# Patient Record
Sex: FEMALE | Race: BLACK OR AFRICAN AMERICAN | NOT HISPANIC OR LATINO | Employment: FULL TIME
[De-identification: names, ages, dates, MRNs, and addresses within clinical notes are randomized per-mention and may not be internally consistent; named-entity substitution may affect disease eponyms.]

---

## 2017-06-10 ENCOUNTER — HEALTH MAINTENANCE LETTER (OUTPATIENT)
Age: 47
End: 2017-06-10

## 2018-01-18 ENCOUNTER — COMMUNICATION - HEALTHEAST (OUTPATIENT)
Dept: SURGERY | Facility: CLINIC | Age: 48
End: 2018-01-18

## 2018-03-30 ENCOUNTER — OFFICE VISIT - HEALTHEAST (OUTPATIENT)
Dept: FAMILY MEDICINE | Facility: CLINIC | Age: 48
End: 2018-03-30

## 2018-03-30 DIAGNOSIS — J34.89 SINUS PRESSURE: ICD-10-CM

## 2018-07-31 ENCOUNTER — RECORDS - HEALTHEAST (OUTPATIENT)
Dept: ADMINISTRATIVE | Facility: OTHER | Age: 48
End: 2018-07-31

## 2018-07-31 ENCOUNTER — AMBULATORY - HEALTHEAST (OUTPATIENT)
Dept: SURGERY | Facility: CLINIC | Age: 48
End: 2018-07-31

## 2018-07-31 DIAGNOSIS — E66.01 MORBID OBESITY DUE TO EXCESS CALORIES (H): ICD-10-CM

## 2018-08-14 ENCOUNTER — AMBULATORY - HEALTHEAST (OUTPATIENT)
Dept: LAB | Facility: CLINIC | Age: 48
End: 2018-08-14

## 2018-08-14 ENCOUNTER — OFFICE VISIT - HEALTHEAST (OUTPATIENT)
Dept: SURGERY | Facility: CLINIC | Age: 48
End: 2018-08-14

## 2018-08-14 DIAGNOSIS — K91.2 POSTOPERATIVE MALABSORPTION: ICD-10-CM

## 2018-08-14 DIAGNOSIS — E66.01 MORBID OBESITY (H): ICD-10-CM

## 2018-08-14 LAB
ALBUMIN SERPL-MCNC: 3.4 G/DL (ref 3.5–5)
ALP SERPL-CCNC: 80 U/L (ref 45–120)
ALT SERPL W P-5'-P-CCNC: 10 U/L (ref 0–45)
ANION GAP SERPL CALCULATED.3IONS-SCNC: 9 MMOL/L (ref 5–18)
AST SERPL W P-5'-P-CCNC: 16 U/L (ref 0–40)
BILIRUB SERPL-MCNC: 0.4 MG/DL (ref 0–1)
BUN SERPL-MCNC: 9 MG/DL (ref 8–22)
CALCIUM SERPL-MCNC: 9.1 MG/DL (ref 8.5–10.5)
CHLORIDE BLD-SCNC: 108 MMOL/L (ref 98–107)
CHOLEST SERPL-MCNC: 178 MG/DL
CO2 SERPL-SCNC: 23 MMOL/L (ref 22–31)
CREAT SERPL-MCNC: 0.7 MG/DL (ref 0.6–1.1)
ERYTHROCYTE [DISTWIDTH] IN BLOOD BY AUTOMATED COUNT: 13.3 % (ref 11–14.5)
FASTING STATUS PATIENT QL REPORTED: NORMAL
FERRITIN SERPL-MCNC: 7 NG/ML (ref 10–130)
FOLATE SERPL-MCNC: 9.1 NG/ML
GFR SERPL CREATININE-BSD FRML MDRD: >60 ML/MIN/1.73M2
GLUCOSE BLD-MCNC: 90 MG/DL (ref 70–125)
HBA1C MFR BLD: 5.7 % (ref 3.5–6)
HCT VFR BLD AUTO: 33.9 % (ref 35–47)
HDLC SERPL-MCNC: 75 MG/DL
HGB BLD-MCNC: 11.3 G/DL (ref 12–16)
LDLC SERPL CALC-MCNC: 94 MG/DL
MCH RBC QN AUTO: 29.5 PG (ref 27–34)
MCHC RBC AUTO-ENTMCNC: 33.4 G/DL (ref 32–36)
MCV RBC AUTO: 88 FL (ref 80–100)
PLATELET # BLD AUTO: 262 THOU/UL (ref 140–440)
PMV BLD AUTO: 8.8 FL (ref 7–10)
POTASSIUM BLD-SCNC: 5 MMOL/L (ref 3.5–5)
PREALB SERPL-MCNC: 20.1 MG/DL (ref 19–38)
PROT SERPL-MCNC: 6.6 G/DL (ref 6–8)
PTH-INTACT SERPL-MCNC: 118 PG/ML (ref 10–86)
RBC # BLD AUTO: 3.84 MILL/UL (ref 3.8–5.4)
SODIUM SERPL-SCNC: 140 MMOL/L (ref 136–145)
TRIGL SERPL-MCNC: 43 MG/DL
VIT B12 SERPL-MCNC: >2000 PG/ML (ref 213–816)
WBC: 4.9 THOU/UL (ref 4–11)

## 2018-08-14 ASSESSMENT — MIFFLIN-ST. JEOR: SCORE: 1904.3

## 2018-08-15 LAB — 25(OH)D3 SERPL-MCNC: 21.3 NG/ML (ref 30–80)

## 2018-08-16 LAB — ZINC SERPL-MCNC: 61 UG/DL (ref 60–120)

## 2018-08-17 LAB
ANNOTATION COMMENT IMP: NORMAL
VIT A SERPL-MCNC: 0.44 MG/L (ref 0.3–1.2)
VITAMIN A (RETINYL PALMITATE): <0.02 MG/L (ref 0–0.1)

## 2018-08-18 LAB — VIT B1 PYROPHOSHATE BLD-SCNC: 87 NMOL/L (ref 70–180)

## 2018-08-21 ENCOUNTER — COMMUNICATION - HEALTHEAST (OUTPATIENT)
Dept: SURGERY | Facility: CLINIC | Age: 48
End: 2018-08-21

## 2018-08-22 ENCOUNTER — COMMUNICATION - HEALTHEAST (OUTPATIENT)
Dept: SURGERY | Facility: CLINIC | Age: 48
End: 2018-08-22

## 2018-08-22 ENCOUNTER — AMBULATORY - HEALTHEAST (OUTPATIENT)
Dept: SURGERY | Facility: CLINIC | Age: 48
End: 2018-08-22

## 2018-08-22 DIAGNOSIS — D50.9 IRON DEFICIENCY ANEMIA, UNSPECIFIED IRON DEFICIENCY ANEMIA TYPE: ICD-10-CM

## 2018-08-22 DIAGNOSIS — E21.1 HYPERPARATHYROIDISM , SECONDARY, NON-RENAL (H): ICD-10-CM

## 2018-12-19 ENCOUNTER — COMMUNICATION - HEALTHEAST (OUTPATIENT)
Dept: SURGERY | Facility: CLINIC | Age: 48
End: 2018-12-19

## 2018-12-19 ENCOUNTER — AMBULATORY - HEALTHEAST (OUTPATIENT)
Dept: SURGERY | Facility: CLINIC | Age: 48
End: 2018-12-19

## 2018-12-19 DIAGNOSIS — K91.2 POSTOPERATIVE MALABSORPTION: ICD-10-CM

## 2021-06-01 VITALS — BODY MASS INDEX: 47.98 KG/M2 | HEIGHT: 65 IN | WEIGHT: 288 LBS

## 2021-06-01 VITALS — BODY MASS INDEX: 47 KG/M2 | WEIGHT: 278.13 LBS

## 2021-06-05 ENCOUNTER — RECORDS - HEALTHEAST (OUTPATIENT)
Dept: SURGERY | Facility: CLINIC | Age: 51
End: 2021-06-05

## 2021-06-05 DIAGNOSIS — Z01.818 PRE-OPERATIVE EXAMINATION: ICD-10-CM

## 2021-06-16 PROBLEM — D50.9 ANEMIA, IRON DEFICIENCY: Status: ACTIVE | Noted: 2018-08-22

## 2021-06-16 PROBLEM — E21.1 HYPERPARATHYROIDISM , SECONDARY, NON-RENAL (H): Status: ACTIVE | Noted: 2018-08-22

## 2021-06-17 NOTE — PROGRESS NOTES
Subjective:      Patient ID: Angelica Montaño is a 48 y.o. female.    Chief Complaint:    HPI Angelica Montaño is a 48 y.o. female with PMHx of allergies who presents today complaining of fronal sinus pain x 1 day. Patient states that she has a hx of sinus infections. She has been taking Tylenol and it does not help.  She rates her headache 6 out of 10.  It is bilateral frontal.  She cannot take ibuprofen because she has had weight loss surgery.  She is currently taking Allegra as needed for allergy symptoms.  She denies any current nasal congestion.  Denies any fever, cough, abdominal complaints, rash, congestion, or sore throat.         Past Medical History:   Diagnosis Date     Arthritis     ankle stiffness     Dyslipidemia      Metabolic syndrome      Morbid obesity with BMI of 50.0-59.9, adult      Post-nasal drip      Pre-diabetes 3/2015    AIC 6.4     Sleep apnea     Uses CPAP     Vitamin D deficiency        Past Surgical History:   Procedure Laterality Date     APPENDECTOMY      Possible, pt unsure      SECTION, CLASSIC       ESOPHAGOGASTRODUODENOSCOPY N/A 2015    Procedure: ESOPHAGOGASTRODUODENOSCOPY with gastric biopsy using biopsy forceps;  Surgeon: Dominick Nieves MD;  Location: [facility] GI;  Service:      FOOT SURGERY Left     straightened toes     GASTRIC RESTRICTION SURGERY      vertical banding     RI LAP GASTRIC BYPASS/YONG-EN-Y N/A 2015    Procedure:  CONVERT VERTICAL BANDED GASTROPLASTY TO LAPAROSCOPIC YONG EN Y;  Surgeon: Sam Lyles MD;  Location: [facility] OR;  Service: General     REPAIR PATELLAR TENDON Left 1996     WISDOM TOOTH EXTRACTION         Family History   Problem Relation Age of Onset     Stroke Mother      Hypertension Mother      Heart disease Father      Anesthesia problems Neg Hx      Colon cancer Neg Hx        Social History   Substance Use Topics     Smoking status: Former Smoker     Quit date: 2010     Smokeless tobacco: Never Used     Alcohol  use Yes      Comment: occasionally       Review of Systems   Constitutional: Negative for fever.   HENT: Positive for ear pain (right ear pressure; chronic during the winter), sinus pain and sneezing. Negative for congestion and sore throat.    Respiratory: Negative for cough.    Gastrointestinal: Negative for abdominal pain, nausea and vomiting.   Skin: Negative for rash.   Allergic/Immunologic: Positive for environmental allergies.   Neurological: Positive for light-headedness (mild) and headaches (bilateral frontal).       Objective:     /84 (Patient Site: Right Arm, Patient Position: Sitting, Cuff Size: Adult Large)  Pulse 66  Temp 98.1  F (36.7  C) (Oral)   Resp 16  Wt (!) 278 lb 2 oz (126.2 kg)  SpO2 100%  BMI 47 kg/m2    Physical Exam   Constitutional: She appears well-developed and well-nourished. No distress.   HENT:   Head: Normocephalic and atraumatic.   Right Ear: External ear and ear canal normal. A middle ear effusion is present.   Left Ear: Tympanic membrane, external ear and ear canal normal.   Nose: Nose normal. Right sinus exhibits no maxillary sinus tenderness and no frontal sinus tenderness. Left sinus exhibits no maxillary sinus tenderness and no frontal sinus tenderness.   Mouth/Throat: Uvula is midline. No oropharyngeal exudate, posterior oropharyngeal edema or posterior oropharyngeal erythema.   Boggy pale turbinates.    Eyes: Conjunctivae and EOM are normal. Pupils are equal, round, and reactive to light. Right eye exhibits no discharge. Left eye exhibits no discharge.   Neck: Normal range of motion. Neck supple.   Cardiovascular: Normal rate, regular rhythm and normal heart sounds.  Exam reveals no gallop and no friction rub.    No murmur heard.  Pulmonary/Chest: Effort normal and breath sounds normal. No respiratory distress. She has no wheezes. She has no rales.   Lymphadenopathy:     She has no cervical adenopathy.   Skin: She is not diaphoretic.   Psychiatric: She has a  normal mood and affect. Her behavior is normal. Judgment and thought content normal.   Nursing note and vitals reviewed.    Clinical Decision Making:  Patient started on supportive cares with nasal Flonase and Mucinex. Recommend follow up if sinus symptoms are not improving over the course of the next 5 days.     Assessment:     Procedures    1. Sinus pressure  fluticasone (FLONASE ALLERGY RELIEF) 50 mcg/actuation nasal spray    guaiFENesin 200 mg tablet         Patient Instructions   1. Take Flonase 1-2 sprays in each nostril before bed.  2.  I recommend using Mucinex (guaifenesin) to help thin mucus secretions.  Adding a saline nasal spray can also be helpful with clearing sinus congestion.  3.  Take Tylenol as needed for pain or fever control.  4.  Follow-up with your primary care provider if you not having any improvement in your symptoms over the next 5 days.  5. It may be beneficial to discuss your allergy symptoms with an allergy specialist at some point if you are not having symptom control with your current antihistamine plan.

## 2021-06-19 NOTE — PROGRESS NOTES
[facility] Follow Up Visit for Previous Bariatric Surgery   Date of visit: 8/14/2018  Physician: Lucy Delcid MD  Primary Care is Daniel Sifuentes MD.  Angelica Montaño   48 y.o.  female    Date of Surgery: 12/20/2015  Initial Weight: 355.7 pounds  Initial BMI: 60.24  Today's Weight:   Wt Readings from Last 1 Encounters:   08/14/18 (!) 288 lb (130.6 kg)     Body mass index is 48.67 kg/(m^2).  Weight: 288 lb (130.6 kg)     Assessment and Plan   Assessment: Angelica is a 48 y.o. year old female who is almost 3 years s/p conversion of VBG to RNY ..  They have had a durable weight loss of 67 lbs since surgery.  Overall compliance with the [facility] has been poor. She lost her insurance and therefore couldn't follow up for awhile.    Angelica Montaño feels that she has achieved the goal(s) identified pre-operatively. She would like to lose more weight      Plan:    1. Postoperative malabsorption  Pt is not taking adequate vitamin supplementation. We reviewed routine vitamins s/p RNY and written information was given.  He has not had routine postoperative labs for couple of years.  These were ordered.    2. Morbid obesity (H)  Patient was congratulated on her success thus far.  Healthy habits to assist with further weight loss were discussed.  Written information was given.  She is also going to meet with her dietitian as she is struggling getting adequate protein with her current vegetarian diet.          >25 min spent with patient, >50 % spent in counseling and coordination of care     Bariatric Surgery Review   Interim History/LifeChanges: Patient lost her insurance and was unable to follow-up.  She has gotten off track with her eating.  She has lost track of which vitamins she should be taking.    Patient Concerns: Weight regain    Hunger 1-10: 2    GERD no    Medication changes: No recent    Vitamin Intake:   Multivitamin   none, takes zinc and separate    Vitamin D  1000  international units per day   Calcium  CARBONATE   Vit. B-12    b12 sl     Habits:            Alcohol Intake  some   NSAID Use  none   Caffeine Use  instant coffee in her protein shake 5 days per week   Exercise  walking approx 3 miles most days   CPAP Use:  using   Birth Control  not sexually active   Tobacco Use     no                                      LABS:ordered  LABS:  Lab Results   Component Value Date    WBC 7.3 08/11/2016    HGB 12.0 08/11/2016    HCT 36.1 08/11/2016    MCV 89 08/11/2016    PLT  08/11/2016      Comment:      Clumped Platelets; estimate from smear appears to be normal. Giant platelets present       Lab Results   Component Value Date    UHTPXUNM89KS 18.3 (L) 08/11/2016    Lab Results   Component Value Date    HGBA1C 5.5 08/11/2016      Lab Results   Component Value Date    CHOL 184 02/24/2015    Lab Results   Component Value Date     (H) 08/11/2016         Lab Results   Component Value Date    FERRITIN 27 08/11/2016      Lab Results   Component Value Date    HDL 47 02/24/2015      Lab Results   Component Value Date    BCBWINUW47 >2000 (H) 08/11/2016    Lab Results   Component Value Date    16325 104 08/11/2016      Lab Results   Component Value Date    LDLCALC 118 02/24/2015    Lab Results   Component Value Date    TSH 1.79 02/24/2015    Lab Results   Component Value Date    FOLATE 7.3 08/11/2016      Lab Results   Component Value Date    TRIG 97 02/24/2015    Lab Results   Component Value Date    ALT 14 08/11/2016    AST 15 08/11/2016    ALKPHOS 108 08/11/2016    BILITOT 0.3 08/11/2016    No results found for: TESTOSTERONE     No components found for: CHOLHDL Lab Results   Component Value Date    7597 35.9 08/11/2016      @lauren(vitamin a: 1)@             Patient Profile   Social History     Social History Narrative        Past Medical History   Past Medical History:   Diagnosis Date     Arthritis     ankle stiffness     Dyslipidemia      Metabolic syndrome      Morbid obesity  "with BMI of 50.0-59.9, adult (H)      Post-nasal drip      Pre-diabetes 3/2015    AIC 6.4     Sleep apnea     Uses CPAP     Vitamin D deficiency      Patient Active Problem List   Diagnosis     Morbid obesity (H)     Vitamin D deficiency     Morbid obesity with BMI of 50.0-59.9, adult (H)     Metabolic syndrome     Pre-diabetes     Dyslipidemia     S/P gastric bypass     Zinc deficiency     Current Outpatient Prescriptions   Medication Sig     ASCORBATE CALCIUM (VITAMIN C ORAL) Take by mouth.     CALCIUM ORAL Take by mouth.     cholecalciferol, vitamin D3, 1,000 unit tablet Take 1,000 Units by mouth daily.     cyanocobalamin, vitamin B-12, (VITAMIN B-12) 500 mcg Lozg Take 1 tablet by mouth daily.     fexofenadine (ALLEGRA) 60 MG tablet Take 60 mg by mouth daily.     fluticasone (FLONASE ALLERGY RELIEF) 50 mcg/actuation nasal spray 1-2 sprays in each nostril at bedtime.     sertraline (ZOLOFT) 50 MG tablet Take 50 mg by mouth daily.     ZINC ORAL Take by mouth.       Past Surgical History  She has a past surgical history that includes  section, classic; Repair patellar tendon (Left, ); [place] tooth extraction; Appendectomy; Esophagogastroduodenoscopy (N/A, 2015); Gastric restriction surgery; Foot surgery (Left); and pr lap gastric bypass/bj-en-y (N/A, 2015).     Examination   /65 (Patient Site: Right Arm, Patient Position: Sitting, Cuff Size: Adult Large)  Pulse 62  Ht 5' 4.5\" (1.638 m)  Wt (!) 288 lb (130.6 kg)  SpO2 95%  Breastfeeding? No  BMI 48.67 kg/m2  Height: 5' 4.5\" (1.638 m) (2018  9:38 AM)  Weight: 288 lb (130.6 kg) (2018  9:38 AM)  BMI (Calculated): 48.7 (2018  9:38 AM)  SpO2: 95 % (2018  9:38 AM)  General:  Alert and ambulatory,   HEENT:  No conjunctival pallor, moist mucous Membranes, neck is without LAD  Pulmonary:  Normal respiratory effort, no cough, no audible wheezes/crackles.  CV:  Regular rate and Rhythm, no murmurs  Abdominal: Scars well " healed, BS normal,soft, NT without rebound or guarding  Extremities: No edema  Skin: No rashes  Pscyh/Mood: Stable         Counseling:   We reviewed the important post op bariatric recommendations:  -eating 3 meals daily  -eating protein first, getting >60gm protein daily  -eating slowly, chewing food well  -avoiding/limiting calorie containing beverages  -drinking water 15-30 minutes before or after meals  -choosing wheat, not white with breads, crackers, pastas, cora, bagels, tortillas, rice  -limiting restaurant or cafeteria eating to twice a week or less    We discussed the importance of restorative sleep and stress management in maintaining a healthy weight.  We discussed the National Weight Control Registry healthy weight maintenance strategies and ways to optimize metabolism.  We discussed the importance of physical activity including cardiovascular and strength training in maintaining a healthier weight.  We discussed the importance of life-long vitamin supplementation and life-long  follow-up.    Angelica was reminded that, to avoid marginal ulcers she should avoid tobacco at all, alcohol in excess, caffeine in excess, and NSAIDS (unless indicated for cardioprotection or othewise and opposed by a PPI).    COLT Delcid MD  [facility].  8/14/2018  9:50 AM      Much or all of the text in this note was generated through the use of Dragon Dictate voice-to-text software. Errors in spelling or words which seem out of context are unintentional. Sound alike errors, in particular, may have escaped editing.        No images are attached to the encounter.

## 2022-02-02 ENCOUNTER — LAB REQUISITION (OUTPATIENT)
Dept: LAB | Facility: CLINIC | Age: 52
End: 2022-02-02

## 2022-02-02 DIAGNOSIS — R25.2 CRAMP AND SPASM: ICD-10-CM

## 2022-02-02 DIAGNOSIS — E88.810 METABOLIC SYNDROME: ICD-10-CM

## 2022-02-02 DIAGNOSIS — Z98.84 BARIATRIC SURGERY STATUS: ICD-10-CM

## 2022-02-02 DIAGNOSIS — F41.1 GENERALIZED ANXIETY DISORDER: ICD-10-CM

## 2022-02-02 PROCEDURE — 84466 ASSAY OF TRANSFERRIN: CPT | Performed by: FAMILY MEDICINE

## 2022-02-02 PROCEDURE — 82607 VITAMIN B-12: CPT | Performed by: FAMILY MEDICINE

## 2022-02-02 PROCEDURE — 83036 HEMOGLOBIN GLYCOSYLATED A1C: CPT | Performed by: FAMILY MEDICINE

## 2022-02-02 PROCEDURE — 86038 ANTINUCLEAR ANTIBODIES: CPT | Performed by: FAMILY MEDICINE

## 2022-02-02 PROCEDURE — 80053 COMPREHEN METABOLIC PANEL: CPT | Performed by: FAMILY MEDICINE

## 2022-02-02 PROCEDURE — 82306 VITAMIN D 25 HYDROXY: CPT | Performed by: FAMILY MEDICINE

## 2022-02-02 PROCEDURE — 84443 ASSAY THYROID STIM HORMONE: CPT | Performed by: FAMILY MEDICINE

## 2022-02-02 PROCEDURE — 85652 RBC SED RATE AUTOMATED: CPT | Performed by: FAMILY MEDICINE

## 2022-02-03 LAB
ALBUMIN SERPL-MCNC: 3.9 G/DL (ref 3.5–5)
ALP SERPL-CCNC: 93 U/L (ref 45–120)
ALT SERPL W P-5'-P-CCNC: 13 U/L (ref 0–45)
ANION GAP SERPL CALCULATED.3IONS-SCNC: 14 MMOL/L (ref 5–18)
AST SERPL W P-5'-P-CCNC: 21 U/L (ref 0–40)
BILIRUB SERPL-MCNC: 0.4 MG/DL (ref 0–1)
BUN SERPL-MCNC: 9 MG/DL (ref 8–22)
CALCIUM SERPL-MCNC: 8.4 MG/DL (ref 8.5–10.5)
CHLORIDE BLD-SCNC: 104 MMOL/L (ref 98–107)
CO2 SERPL-SCNC: 19 MMOL/L (ref 22–31)
CREAT SERPL-MCNC: 0.79 MG/DL (ref 0.6–1.1)
ERYTHROCYTE [SEDIMENTATION RATE] IN BLOOD BY WESTERGREN METHOD: 17 MM/HR (ref 0–20)
GFR SERPL CREATININE-BSD FRML MDRD: 88 ML/MIN/1.73M2
GLUCOSE BLD-MCNC: 92 MG/DL (ref 70–125)
HBA1C MFR BLD: 5.4 %
IRON SATN MFR SERPL: 12 % (ref 20–50)
IRON SERPL-MCNC: 48 UG/DL (ref 42–175)
POTASSIUM BLD-SCNC: 3.8 MMOL/L (ref 3.5–5)
PROT SERPL-MCNC: 7.1 G/DL (ref 6–8)
SODIUM SERPL-SCNC: 137 MMOL/L (ref 136–145)
TIBC SERPL-MCNC: 390 UG/DL (ref 313–563)
TRANSFERRIN SERPL-MCNC: 312 MG/DL (ref 212–360)
TSH SERPL DL<=0.005 MIU/L-ACNC: 1.3 UIU/ML (ref 0.3–5)
VIT B12 SERPL-MCNC: >2000 PG/ML (ref 213–816)

## 2022-02-04 LAB — DEPRECATED CALCIDIOL+CALCIFEROL SERPL-MC: 22 UG/L (ref 30–80)

## 2022-02-07 LAB — ANA SER QL IF: NEGATIVE

## 2022-10-07 ENCOUNTER — LAB REQUISITION (OUTPATIENT)
Dept: LAB | Facility: CLINIC | Age: 52
End: 2022-10-07

## 2022-10-07 DIAGNOSIS — Z12.4 ENCOUNTER FOR SCREENING FOR MALIGNANT NEOPLASM OF CERVIX: ICD-10-CM

## 2022-10-07 PROCEDURE — 87624 HPV HI-RISK TYP POOLED RSLT: CPT | Performed by: NURSE PRACTITIONER

## 2022-10-07 PROCEDURE — G0145 SCR C/V CYTO,THINLAYER,RESCR: HCPCS | Performed by: NURSE PRACTITIONER

## 2022-10-12 LAB
BKR LAB AP GYN ADEQUACY: NORMAL
BKR LAB AP GYN INTERPRETATION: NORMAL
BKR LAB AP HPV REFLEX: NORMAL
BKR LAB AP LMP: NORMAL
BKR LAB AP PREVIOUS ABNORMAL: NORMAL
PATH REPORT.COMMENTS IMP SPEC: NORMAL
PATH REPORT.COMMENTS IMP SPEC: NORMAL
PATH REPORT.RELEVANT HX SPEC: NORMAL

## 2022-10-14 LAB
HUMAN PAPILLOMA VIRUS 16 DNA: NEGATIVE
HUMAN PAPILLOMA VIRUS 18 DNA: NEGATIVE
HUMAN PAPILLOMA VIRUS FINAL DIAGNOSIS: NORMAL
HUMAN PAPILLOMA VIRUS OTHER HR: NEGATIVE

## 2023-02-04 ENCOUNTER — HOSPITAL ENCOUNTER (EMERGENCY)
Facility: HOSPITAL | Age: 53
Discharge: HOME | End: 2023-02-04
Attending: EMERGENCY MEDICINE | Admitting: EMERGENCY MEDICINE
Payer: COMMERCIAL

## 2023-02-04 VITALS
RESPIRATION RATE: 18 BRPM | OXYGEN SATURATION: 100 % | DIASTOLIC BLOOD PRESSURE: 88 MMHG | WEIGHT: 268 LBS | TEMPERATURE: 99.1 F | HEART RATE: 78 BPM | SYSTOLIC BLOOD PRESSURE: 187 MMHG | HEIGHT: 64 IN | BODY MASS INDEX: 45.75 KG/M2

## 2023-02-04 DIAGNOSIS — L72.3 SEBACEOUS CYST: ICD-10-CM

## 2023-02-04 PROBLEM — F33.1 MAJOR DEPRESSIVE DISORDER, RECURRENT, MODERATE (H): Status: ACTIVE | Noted: 2023-02-04

## 2023-02-04 PROCEDURE — 99282 EMERGENCY DEPT VISIT SF MDM: CPT

## 2023-02-04 NOTE — ED PROVIDER NOTES
"EMERGENCY DEPARTMENT ENCOUNTER            IMPRESSION:  Uncomplicated finger laceration  Sebaceous cyst of the back      MEDICAL DECISION MAKING:  Patient here for 2 concerns.  She has a small finger laceration of the distal tip of the finger.    She also reports a cyst of the left scapular region.  She has expressed some debris    On exam her blood pressure is elevated.  She has a 1 cm laceration of the palmar aspect of her right little finger.  There is no bony tenderness.    She has a 2 cm left scapular sebaceous cyst.  There is no evidence of infection.  There has been some drainage    Finger laceration was cleaned and the wound margins reapproximated and covered with sterile dry    She will be discharged home.    I have placed an outpatient follow-up with general surgery for cyst excision    =================================================================  CHIEF COMPLAINT:  Chief Complaint   Patient presents with     skin tear     lump on left shoulder area         HPI  Angelica Montaño is a 53 year old female with a history of obesity, prediabetes, dyslipidemia, s/p gastric bypass, zinc deficiency, hyperparathyroidism, anemia, cannabis dependence, depression, and s/p appendectomy who presents to the ED by walk in for evaluation of finger laceration.    Last night, patient was grabbing for her keys when her right 5th digit tore, causing a small laceration. Bleeding is controlled. Her son recommended that she come in.    Patient additionally notes she has a \"lump\" on her left shoulder. She states that some brown liquid came out of the area when squeezed. Patient was worried about cancer.    I, Alcira Padron am serving as a scribe to document services personally performed by Dr. Chris Xiong MD, based on my observation and the provider's statements to me. I, Dr. Chris Xiong MD attest that Alcira Padron is acting in a scribe capacity, has observed my performance of the services and has documented them " in accordance with my direction.      REVIEW OF SYSTEMS   Constitutional: Denies fever, chills, unintentional weight loss or fatigue   Eyes: Denies visual changes or discharge    HENT: Denies sore throat, ear pain or neck pain  Respiratory: Denies cough or shortness of breath    Cardiovascular: Denies chest pain, palpitations or leg swelling  GI: Denies abdominal pain, nausea, vomiting, or dark, bloody stools.    : Denies hematuria, dysuria, or flank pain  Musculoskeletal: Denies any new back pain or new muscle/joint pains  Skin: Denies rash. Positive for laceration to right fifth digit, left shoulder cyst  Neurologic: Denies current headache, new weakness, focal weakness, or sensory changes    Lymphatic: Denies swollen glands    Psychiatric: Denies depression, suicidal ideation or homicidal ideation.    Remainder of systems reviewed, unless noted in HPI all others negative.      PAST MEDICAL HISTORY:  No past medical history on file.    PAST SURGICAL HISTORY:  Past Surgical History:   Procedure Laterality Date     APPENDECTOMY      Possible, pt unsure      SECTION       Correction of left fifth hammertoe with arthroplasty and extensor tenotomy and capsulotomy at the metatarsophalangeal joint.  2010     Correction of left fourth mallet toe.  2010     ESOPHAGOSCOPY, GASTROSCOPY, DUODENOSCOPY (EGD), COMBINED N/A 2015    Procedure: ESOPHAGOGASTRODUODENOSCOPY with gastric biopsy using biopsy forceps;  Surgeon: Dominick Nieves MD;  Location: [facility];  Service:      Excision of dorsal exostosis of left foot  2010     FOOT SURGERY Left     straightened toes     GASTRIC RESTRICTION SURGERY      vertical banding     AR LAP GASTRIC BYPASS/YONG-EN-Y N/A 2015    Procedure:  CONVERT VERTICAL BANDED GASTROPLASTY TO LAPAROSCOPIC YONG EN Y;  Surgeon: Sam Lyles MD;  Location: [facility] OR;  Service: General     REPAIR PATELLAR TENDON Left      WISDOM TOOTH EXTRACTION           CURRENT  "MEDICATIONS:    citalopram (CELEXA) 40 MG tablet  hydrocodone-acetaminophen (NORCO) 5-325 MG per tablet        ALLERGIES:  Allergies   Allergen Reactions     Other Environmental Allergy Other (See Comments)     Seasonal allergies cause post nasal drip       FAMILY HISTORY:  Family History   Problem Relation Age of Onset     Cerebrovascular Disease Mother      Hypertension Mother      Heart Disease Father      Anesthesia Reaction No family hx of      Colon Cancer No family hx of        SOCIAL HISTORY:   Social History     Socioeconomic History     Marital status: Single   Tobacco Use     Smoking status: Former     Types: Cigarettes     Quit date: 2010     Years since quittin.1     Smokeless tobacco: Never   Substance and Sexual Activity     Alcohol use: Yes     Comment: Alcoholic Drinks/day: occasionally     Drug use: No     Comment: Drug use: occasionally-last time Oct 2015       PHYSICAL EXAM:    BP (!) 187/88   Pulse 78   Temp 99.1  F (37.3  C) (Temporal)   Resp 18   Ht 1.626 m (5' 4\")   Wt 121.6 kg (268 lb)   LMP 2023 (Exact Date)   SpO2 100%   BMI 46.00 kg/m      Constitutional: Awake, alert, in no acute distress  Respiratory: Respirations even,   Cardiovascular: Regular rate and rhythm.  Back: No CVA tenderness.    Musculoskeletal: Right little finger distal palmar aspect.  1 cm superficial laceration.  Full function and preserved sensation  Integument: 3 cm sebaceous cyst left scapula  Lymphatic: No cervical lymphadenopathy  Neurologic: Alert & oriented x 3. Normal speech. Grossly normal motor and sensory function. No focal deficits noted.  NIHSS = 0  Psychiatric: Normal mood and affect. Normal judgement.    ED COURSE:    3:30 PM I introduced myself to the patient, obtained patient history, performed a physical exam, and discussed plan for ED workup including potential diagnostic laboratory/imaging studies and interventions.    3:51 PM Rechecked and updated the patient. We discussed the " plan for discharge and the patient is agreeable. Reviewed supportive cares, symptomatic treatment, outpatient follow up, and reasons to return to the Emergency Department. Patient to be discharged by ED RN.       Medical Decision Making    History:    Supplemental history from: Family    External Record(s) reviewed: External medical records care everywhere    Work Up:    Chart documentation includes differential considered and any EKGs or imaging independently interpreted by provider.  Laboratory, EKG, radiology independently reviewed    In additional to work up documented, I considered the following work up:    External consultation:    Discussion of management with another provider: none    Complicating factors:    Care impacted by chronic illness: dyslipidemia, cannabis dependence, and pre-diabetes    Care affected by social determinants of health: Access to care    Disposition considerations: Discharge.       PROCEDURES:   Right little finger palmar aspect.  1 cm superficial laceration.  The wound was irrigated and cleaned.  2 Steri-Strips were applied with good reapproximation of the wound margins.  Sterile dressing applied        Prior to making a final disposition on this patient the results of patient's tests and other diagnostic studies were discussed with the patient. All questions were answered. Patient expressed understanding of the plan and was amenable to it.      FINAL DIAGNOSIS:    ICD-10-CM    1. Sebaceous cyst  L72.3                  NAME: Angelica Montaño  AGE: 53 year old female  YOB: 1970  MRN: [id number]  EVALUATION DATE & TIME: 2/4/2023  3:12 PM    PCP: No primary care provider on file.    ED PROVIDER: Chris Xiong M.D.      Alcira ROBLES, am serving as a scribe to document services personally performed by Dr. Chris Xiong based on my observation and the provider's statements to me. IChris MD attest that Alcira Padron is acting in a scribe capacity, has  observed my performance of the services and has documented them in accordance with my direction.    Chris Xiong M.D.  Emergency Medicine  [facility] EMERGENCY DEPARTMENT  [facility] [facility] [id number] [phone]  Dept: [phone]  2/4/2023       Chris Xiong MD  02/04/23 155

## 2023-02-04 NOTE — DISCHARGE INSTRUCTIONS
Keep the finger injury dry and clean for 1 week.  Allow the tape to fall off  Contact above surgeon for outpatient cyst removal

## 2023-02-04 NOTE — ED TRIAGE NOTES
Patient comes to ED for evaluation of skin tear to right fifth finger. Stated last night skin ripped out as she was grabbing at her keys. Also c/o lump/pain to left back upper shoulder area.      Triage Assessment     Row Name 02/04/23 1508       Triage Assessment (Adult)    Airway WDL WDL       Respiratory WDL    Respiratory WDL WDL       Skin Circulation/Temperature WDL    Skin Circulation/Temperature WDL X  skin tear to right 5th digit       Cardiac WDL    Cardiac WDL WDL       Peripheral/Neurovascular WDL    Peripheral Neurovascular WDL WDL       Cognitive/Neuro/Behavioral WDL    Cognitive/Neuro/Behavioral WDL WDL

## 2023-02-08 ENCOUNTER — OFFICE VISIT (OUTPATIENT)
Dept: SURGERY | Facility: CLINIC | Age: 53
End: 2023-02-08
Payer: COMMERCIAL

## 2023-02-08 VITALS — SYSTOLIC BLOOD PRESSURE: 124 MMHG | DIASTOLIC BLOOD PRESSURE: 72 MMHG | WEIGHT: 270.2 LBS | BODY MASS INDEX: 46.38 KG/M2

## 2023-02-08 DIAGNOSIS — L08.9 INFECTED CYST OF SKIN: Primary | ICD-10-CM

## 2023-02-08 DIAGNOSIS — L72.9 INFECTED CYST OF SKIN: Primary | ICD-10-CM

## 2023-02-08 PROCEDURE — 99203 OFFICE O/P NEW LOW 30 MIN: CPT | Mod: 25 | Performed by: SURGERY

## 2023-02-08 PROCEDURE — 11402 EXC TR-EXT B9+MARG 1.1-2 CM: CPT | Performed by: SURGERY

## 2023-02-08 PROCEDURE — 88304 TISSUE EXAM BY PATHOLOGIST: CPT | Performed by: PATHOLOGY

## 2023-02-08 RX ORDER — FERROUS GLUCONATE 324(38)MG
324 TABLET ORAL
COMMUNITY

## 2023-02-08 RX ORDER — SERTRALINE HYDROCHLORIDE 100 MG/1
TABLET, FILM COATED ORAL
COMMUNITY
Start: 2023-01-14

## 2023-02-08 RX ORDER — CEPHALEXIN 500 MG/1
500 CAPSULE ORAL 3 TIMES DAILY
Qty: 10 CAPSULE | Refills: 0 | Status: SHIPPED | OUTPATIENT
Start: 2023-02-08 | End: 2023-02-11

## 2023-02-08 NOTE — LETTER
2023         RE: Angelica Montaño  [address]  [address]  [place] [address]        Dear Colleague,    Thank you for referring your patient, Angelica Montaño, to the [facility] [place]. Please see a copy of my visit note below.    This is a consultation from Daniel Sifuentes to address a subcutaneous mass.    HPI: Pt is here with concerns about a subcutaneous mass located on the back of the Left Shoulder.  It has been present for 1 month.   This lesion is tender.  The lesion has drained.    Allergies:Other environmental allergy    No past medical history on file.    Past Surgical History:   Procedure Laterality Date     APPENDECTOMY      Possible, pt unsure      SECTION       Correction of left fifth hammertoe with arthroplasty and extensor tenotomy and capsulotomy at the metatarsophalangeal joint.       Correction of left fourth mallet toe.       ESOPHAGOSCOPY, GASTROSCOPY, DUODENOSCOPY (EGD), COMBINED N/A 2015    Procedure: ESOPHAGOGASTRODUODENOSCOPY with gastric biopsy using biopsy forceps;  Surgeon: Dominick Nieves MD;  Location: [facility];  Service:      Excision of dorsal exostosis of left foot       FOOT SURGERY Left     straightened toes     GASTRIC RESTRICTION SURGERY      vertical banding     [place] LAP GASTRIC BYPASS/YONG-EN-Y N/A 2015    Procedure:  CONVERT VERTICAL BANDED GASTROPLASTY TO LAPAROSCOPIC YONG EN Y;  Surgeon: Sam Lyles MD;  Location: [facility] OR;  Service: General     REPAIR PATELLAR TENDON Left      WISDOM TOOTH EXTRACTION         REVIEW OF SYSTEMS:  10 point ROS is negative except for; as mentioned above.    CURRENT MEDS:    Current Outpatient Medications:      citalopram (CELEXA) 40 MG tablet, Take 1 tablet by mouth daily., Disp: , Rfl:      hydrocodone-acetaminophen (NORCO) 5-325 MG per tablet, Take 1-2 tablets by mouth every 4 hours as needed. For pain, Disp: , Rfl:     LMP  01/23/2023 (Exact Date)   There is no height or weight on file to calculate BMI.    EXAM:  GENERAL:Well developed she appears her stated age  HEAD & NECK: Extraocular motions intact, anicteric sclera,  ABDOMEN: Soft and nondistended, positive bowel sounds  LUNGS:  CTA  HEART:  RRR  EXTREMITIES: Full mobility,   INTEGUMENT: The patient has a subcutaneous lesion located Left Shouder/back.   The lesion is 1.5 cm wide.    Assessment/Plan: The pt has a  1.5 cm  subcutaneous lesion located on the back of the Left shoulder.    This lesion is likely either a Lipoma or a Sebaccous Cyst. These lesion is growing in size and/or is painful at times.  With these features I recommend removal of this lesion.     Sterile prep with Betadine  A field block was infused with 1% lidocaine with epinephrine  A 3 cm elipical incision was made around the lesion  The cyst was excized on block  The subcutaneousl tissues with the bleeding were closed with 3-0 vicryl  The skin closed with 4-0 monocryl  Dressed with gauze and Tegaderm      Dominick Nieves MD ,MD  [phone]  [facility] Department of Surgery      Again, thank you for allowing me to participate in the care of your patient.        Sincerely,        Dominick Nieves MD

## 2023-02-08 NOTE — PROGRESS NOTES
This is a consultation from Daniel Sifuentes to address a subcutaneous mass.    HPI: Pt is here with concerns about a subcutaneous mass located on the back of the Left Shoulder.  It has been present for 1 month.   This lesion is tender.  The lesion has drained.    Allergies:Other environmental allergy    No past medical history on file.    Past Surgical History:   Procedure Laterality Date     APPENDECTOMY      Possible, pt unsure      SECTION       Correction of left fifth hammertoe with arthroplasty and extensor tenotomy and capsulotomy at the metatarsophalangeal joint.       Correction of left fourth mallet toe.       ESOPHAGOSCOPY, GASTROSCOPY, DUODENOSCOPY (EGD), COMBINED N/A 2015    Procedure: ESOPHAGOGASTRODUODENOSCOPY with gastric biopsy using biopsy forceps;  Surgeon: Dominick Nieves MD;  Location: [facility];  Service:      Excision of dorsal exostosis of left foot       FOOT SURGERY Left     straightened toes     GASTRIC RESTRICTION SURGERY      vertical banding     [place] LAP GASTRIC BYPASS/YONG-EN-Y N/A 2015    Procedure:  CONVERT VERTICAL BANDED GASTROPLASTY TO LAPAROSCOPIC YONG EN Y;  Surgeon: Sam Lyles MD;  Location: [facility] OR;  Service: General     REPAIR PATELLAR TENDON Left      WISDOM TOOTH EXTRACTION         REVIEW OF SYSTEMS:  10 point ROS is negative except for; as mentioned above.    CURRENT MEDS:    Current Outpatient Medications:      citalopram (CELEXA) 40 MG tablet, Take 1 tablet by mouth daily., Disp: , Rfl:      hydrocodone-acetaminophen (NORCO) 5-325 MG per tablet, Take 1-2 tablets by mouth every 4 hours as needed. For pain, Disp: , Rfl:     LMP 2023 (Exact Date)   There is no height or weight on file to calculate BMI.    EXAM:  GENERAL:Well developed she appears her stated age  HEAD & NECK: Extraocular motions intact, anicteric sclera,  ABDOMEN: Soft and nondistended, positive bowel sounds  LUNGS:  CTA  HEART:   RRR  EXTREMITIES: Full mobility,   INTEGUMENT: The patient has a subcutaneous lesion located Left Shouder/back.   The lesion is 1.5 cm wide.    Assessment/Plan: The pt has a  1.5 cm  subcutaneous lesion located on the back of the Left shoulder.    This lesion is likely either a Lipoma or a Sebaccous Cyst. These lesion is growing in size and/or is painful at times.  With these features I recommend removal of this lesion.     Sterile prep with Betadine  A field block was infused with 1% lidocaine with epinephrine  A 3 cm elipical incision was made around the lesion  The cyst was excized on block  The subcutaneousl tissues with the bleeding were closed with 3-0 vicryl  The skin closed with 4-0 monocryl  Dressed with gauze and Tegaderm      Dominick Nieves MD ,MD  [phone]  [facility] Department of Surgery

## 2023-02-09 LAB
PATH REPORT.COMMENTS IMP SPEC: NORMAL
PATH REPORT.COMMENTS IMP SPEC: NORMAL
PATH REPORT.FINAL DX SPEC: NORMAL
PATH REPORT.GROSS SPEC: NORMAL
PATH REPORT.MICROSCOPIC SPEC OTHER STN: NORMAL
PATH REPORT.RELEVANT HX SPEC: NORMAL
PHOTO IMAGE: NORMAL

## 2023-08-15 ENCOUNTER — LAB REQUISITION (OUTPATIENT)
Dept: LAB | Facility: CLINIC | Age: 53
End: 2023-08-15

## 2023-08-15 DIAGNOSIS — M79.18 MYALGIA, OTHER SITE: ICD-10-CM

## 2023-08-15 LAB
ALBUMIN SERPL BCG-MCNC: 3.6 G/DL (ref 3.5–5.2)
ALP SERPL-CCNC: 83 U/L (ref 35–104)
ALT SERPL W P-5'-P-CCNC: 16 U/L (ref 0–50)
ANION GAP SERPL CALCULATED.3IONS-SCNC: 9 MMOL/L (ref 7–15)
AST SERPL W P-5'-P-CCNC: 25 U/L (ref 0–45)
BILIRUB SERPL-MCNC: 0.2 MG/DL
BUN SERPL-MCNC: 13.1 MG/DL (ref 6–20)
CALCIUM SERPL-MCNC: 8.9 MG/DL (ref 8.6–10)
CHLORIDE SERPL-SCNC: 105 MMOL/L (ref 98–107)
CK SERPL-CCNC: 498 U/L (ref 26–192)
CREAT SERPL-MCNC: 0.76 MG/DL (ref 0.51–0.95)
CRP SERPL-MCNC: 11.3 MG/L
DEPRECATED CALCIDIOL+CALCIFEROL SERPL-MC: 13 UG/L (ref 20–75)
DEPRECATED HCO3 PLAS-SCNC: 26 MMOL/L (ref 22–29)
ERYTHROCYTE [SEDIMENTATION RATE] IN BLOOD BY WESTERGREN METHOD: 18 MM/HR (ref 0–30)
GFR SERPL CREATININE-BSD FRML MDRD: >90 ML/MIN/1.73M2
GLUCOSE SERPL-MCNC: 92 MG/DL (ref 70–99)
MAGNESIUM SERPL-MCNC: 2.1 MG/DL (ref 1.7–2.3)
POTASSIUM SERPL-SCNC: 4.4 MMOL/L (ref 3.4–5.3)
PROT SERPL-MCNC: 5.8 G/DL (ref 6.4–8.3)
SODIUM SERPL-SCNC: 140 MMOL/L (ref 136–145)
VIT B12 SERPL-MCNC: >4000 PG/ML (ref 232–1245)

## 2023-08-15 PROCEDURE — 85652 RBC SED RATE AUTOMATED: CPT | Performed by: NURSE PRACTITIONER

## 2023-08-15 PROCEDURE — 86140 C-REACTIVE PROTEIN: CPT | Performed by: NURSE PRACTITIONER

## 2023-08-15 PROCEDURE — 82306 VITAMIN D 25 HYDROXY: CPT | Performed by: NURSE PRACTITIONER

## 2023-08-15 PROCEDURE — 82607 VITAMIN B-12: CPT | Performed by: NURSE PRACTITIONER

## 2023-08-15 PROCEDURE — 83735 ASSAY OF MAGNESIUM: CPT | Performed by: NURSE PRACTITIONER

## 2023-08-15 PROCEDURE — 80053 COMPREHEN METABOLIC PANEL: CPT | Performed by: NURSE PRACTITIONER

## 2023-08-15 PROCEDURE — 82550 ASSAY OF CK (CPK): CPT | Performed by: NURSE PRACTITIONER

## 2023-08-28 ENCOUNTER — LAB REQUISITION (OUTPATIENT)
Dept: LAB | Facility: CLINIC | Age: 53
End: 2023-08-28

## 2023-08-28 DIAGNOSIS — M79.10 MYALGIA, UNSPECIFIED SITE: ICD-10-CM

## 2023-08-28 LAB
CK SERPL-CCNC: 414 U/L (ref 26–192)
CRP SERPL-MCNC: 8.12 MG/L
TSH SERPL DL<=0.005 MIU/L-ACNC: 1.53 UIU/ML (ref 0.3–4.2)

## 2023-08-28 PROCEDURE — 82550 ASSAY OF CK (CPK): CPT | Performed by: NURSE PRACTITIONER

## 2023-08-28 PROCEDURE — 86140 C-REACTIVE PROTEIN: CPT | Performed by: NURSE PRACTITIONER

## 2023-08-28 PROCEDURE — 84443 ASSAY THYROID STIM HORMONE: CPT | Performed by: NURSE PRACTITIONER

## 2023-10-05 ENCOUNTER — LAB REQUISITION (OUTPATIENT)
Dept: LAB | Facility: CLINIC | Age: 53
End: 2023-10-05

## 2023-10-05 DIAGNOSIS — R89.9 UNSPECIFIED ABNORMAL FINDING IN SPECIMENS FROM OTHER ORGANS, SYSTEMS AND TISSUES: ICD-10-CM

## 2023-10-05 LAB
CK SERPL-CCNC: 305 U/L (ref 26–192)
CRP SERPL-MCNC: 10.7 MG/L
ERYTHROCYTE [SEDIMENTATION RATE] IN BLOOD BY WESTERGREN METHOD: 12 MM/HR (ref 0–30)

## 2023-10-05 PROCEDURE — 86140 C-REACTIVE PROTEIN: CPT | Performed by: NURSE PRACTITIONER

## 2023-10-05 PROCEDURE — 82550 ASSAY OF CK (CPK): CPT | Performed by: NURSE PRACTITIONER

## 2023-10-05 PROCEDURE — 86431 RHEUMATOID FACTOR QUANT: CPT | Performed by: NURSE PRACTITIONER

## 2023-10-05 PROCEDURE — 86038 ANTINUCLEAR ANTIBODIES: CPT | Performed by: NURSE PRACTITIONER

## 2023-10-05 PROCEDURE — 85652 RBC SED RATE AUTOMATED: CPT | Performed by: NURSE PRACTITIONER

## 2023-10-06 LAB
ANA SER QL IF: NEGATIVE
RHEUMATOID FACT SER NEPH-ACNC: <6 IU/ML

## 2023-11-07 ENCOUNTER — LAB REQUISITION (OUTPATIENT)
Dept: LAB | Facility: CLINIC | Age: 53
End: 2023-11-07

## 2023-11-07 DIAGNOSIS — Z98.84 BARIATRIC SURGERY STATUS: ICD-10-CM

## 2023-11-07 DIAGNOSIS — E55.9 VITAMIN D DEFICIENCY, UNSPECIFIED: ICD-10-CM

## 2023-11-07 LAB
VIT B12 SERPL-MCNC: >4000 PG/ML (ref 232–1245)
VIT D+METAB SERPL-MCNC: 18 NG/ML (ref 20–50)

## 2023-11-07 PROCEDURE — 82607 VITAMIN B-12: CPT | Performed by: NURSE PRACTITIONER

## 2023-11-07 PROCEDURE — 82306 VITAMIN D 25 HYDROXY: CPT | Performed by: NURSE PRACTITIONER
